# Patient Record
(demographics unavailable — no encounter records)

---

## 2024-11-11 NOTE — ASSESSMENT
[FreeTextEntry1] : Patient is a 87 yo M who presents for BPH/LUTS.  Cont flomax and finasteride for combination therapy. Flomax from PCP, finasteride renewed. LUTS are stable, d/w pt that his DM will affect LUTS as well and better glycemic control will improve LUTS as well No more PSAs given age. F/u 1 yr  Pt will require longitudinal care for pt's chronic/complex urologic conditions.

## 2024-11-11 NOTE — HISTORY OF PRESENT ILLNESS
[FreeTextEntry1] : Patient is an 89 yo M with primary medical history of T2DM, CKD, TIA 9/2023, ureteral stone who presents for BPH/LUTS.  Prior hx: He has been taking flomax for years. He reports nocturia x1. No dysuria, hematuria, incontinence. He did see another urologist and he is here for a second opinion. He had cysto, TRUS and urocuff showing BPH/ROMAN. Prostate volume reportedly 65cc. He did have a UTI that was treated with abx and resolved. He was recommended to have Rezum by the other urologist. He and his daughter are here for 2nd opinion on this procedure. Prior PVR ~100cc.  7/22/24 He has been lost for follow up since 2022. He has not undergone any prostate procedure since then. He has been taking tamsulosin 0.4 mg daily. He reports LUTS stable. Recently, he notes intermittent difficulty with urination at night, nocturia x2, incomplete emptying sensation.   He had syncope episode in March, presented to ER, diagnosed with dehydration, treated with IV fluids.  No gross hematuria, dysuria.  PSA 3.21 in 4/2024 11/11/24 He was started on finasteride last visit in addition/combo w flomax.  LUTS are stable.  Sono today shows unremarkable kidneys and prostate volume 42cc. 4/27/24 - Cr 1.31, PSA 3.21 7/30/24 - A1C 7.0

## 2024-12-13 NOTE — REASON FOR VISIT
[Home] : at home, [unfilled] , at the time of the visit. [Medical Office: ([facility])___] : at the medical office located in

## 2024-12-13 NOTE — REASON FOR VISIT
[Home] : at home, [unfilled] , at the time of the visit. [Medical Office: ([facility])___] : at the medical office located in

## 2024-12-15 NOTE — ASSESSMENT
[FreeTextEntry1] : Repeat a noncontrast chest CT scan for f/u in 1 year. Dr. Bright discussed with patient regarding aforementioned chest CT scan findings today.  Start DuoNeb via nebulizer 3 times a day for bronchitis.  Return for pulmonary follow-up in 6 months.     Time spent in telehealth consultation, charting and care coordination is 35 minutes.

## 2024-12-15 NOTE — PROCEDURE
[FreeTextEntry1] :  CT Chest No Cont             Final  No Documents Attached    	 EXAM: [id number] - CT CHEST  - ORDERED BY: KRISTEN NICHOLE   PROCEDURE DATE:  10/28/2024    INTERPRETATION:  CLINICAL INFORMATION: Lung nodule.  COMPARISON: Chest CT from 7/25/2024.  CONTRAST/COMPLICATIONS: IV Contrast: None Oral Contrast: NONE Complications: None reported at time of study completion  PROCEDURE: CT of the Chest was performed. Sagittal and coronal reformats were performed.  FINDINGS:  LUNGS AND LARGE AIRWAYS: A few small benign-appearing diffusely calcified nodules are again present in the right middle lobe. Bronchial wall thickening bilaterally has worsened. Mild biapical pleural-parenchymal scarring. No change in a 5 mm nodular opacity in the anterior segment of the left upper lobe (4/449). This may represent a branch point of pulmonary vessels rather than a lung nodule. No change in a few solid and groundglass opacity micronodules in the left upper lobe. No change in a cluster of tree-in-bud micronodules in the anterobasal segment of the right lower lobe (4/531). PLEURA: No pleural effusion. VESSELS: Severe coronary artery calcification. Mildly calcified aortic valve. Large amount of calcified plaque aorta. Ascending aorta mildly aneurysmal, measuring 4.2 cm. HEART: Heart size is normal. No pericardial effusion. High riding superior pericardial recess again noted. MEDIASTINUM AND RENE: No lymphadenopathy. CHEST WALL AND LOWER NECK: Within normal limits. VISUALIZED UPPER ABDOMEN: Cholecystectomy. Embolization coils right midabdomen. BONES: Degenerative changes. 0.7 cm sclerotic lesion again present T12 vertebral body.  IMPRESSION: 1. Since 7/25/2024, there has been worsening of bronchial wall thickening bilaterally. This may represent bronchitis. Clinical correlation recommended.  2. No change 5 mm nodular opacity left upper lobe, which may represent the branch point of two vessels rather than a true lung nodule.  3. No change in a a few tiny lung nodules bilaterally, including tree-in-bud nodules in the right lower lobe that are consistent with small airways disease.    --- End of Report ---       IRINA PAREKH MD; Attending Radiologist This document has been electronically signed. Oct 31 2024  1:43PM  Ordered by: KRISTEN NICHOLE       Collected/Examined: [id number] 04:07PM       Verified by: KRISTEN NICHOLE [id number] 10:58PM       Result Communication: No patient communication needed at this time; Stage: Final       Resulted: [id number] 01:19PM       Last Updated: 02Nov2024 10:58PM       Accession: [id number]

## 2024-12-15 NOTE — PROCEDURE
[FreeTextEntry1] :  CT Chest No Cont             Final  No Documents Attached    	 EXAM: [id number] - CT CHEST  - ORDERED BY: KRISTEN NICHOLE   PROCEDURE DATE:  10/28/2024    INTERPRETATION:  CLINICAL INFORMATION: Lung nodule.  COMPARISON: Chest CT from 7/25/2024.  CONTRAST/COMPLICATIONS: IV Contrast: None Oral Contrast: NONE Complications: None reported at time of study completion  PROCEDURE: CT of the Chest was performed. Sagittal and coronal reformats were performed.  FINDINGS:  LUNGS AND LARGE AIRWAYS: A few small benign-appearing diffusely calcified nodules are again present in the right middle lobe. Bronchial wall thickening bilaterally has worsened. Mild biapical pleural-parenchymal scarring. No change in a 5 mm nodular opacity in the anterior segment of the left upper lobe (4/449). This may represent a branch point of pulmonary vessels rather than a lung nodule. No change in a few solid and groundglass opacity micronodules in the left upper lobe. No change in a cluster of tree-in-bud micronodules in the anterobasal segment of the right lower lobe (4/531). PLEURA: No pleural effusion. VESSELS: Severe coronary artery calcification. Mildly calcified aortic valve. Large amount of calcified plaque aorta. Ascending aorta mildly aneurysmal, measuring 4.2 cm. HEART: Heart size is normal. No pericardial effusion. High riding superior pericardial recess again noted. MEDIASTINUM AND RENE: No lymphadenopathy. CHEST WALL AND LOWER NECK: Within normal limits. VISUALIZED UPPER ABDOMEN: Cholecystectomy. Embolization coils right midabdomen. BONES: Degenerative changes. 0.7 cm sclerotic lesion again present T12 vertebral body.  IMPRESSION: 1. Since 7/25/2024, there has been worsening of bronchial wall thickening bilaterally. This may represent bronchitis. Clinical correlation recommended.  2. No change 5 mm nodular opacity left upper lobe, which may represent the branch point of two vessels rather than a true lung nodule.  3. No change in a a few tiny lung nodules bilaterally, including tree-in-bud nodules in the right lower lobe that are consistent with small airways disease.    --- End of Report ---       IRINA PAREKH MD; Attending Radiologist This document has been electronically signed. Oct 31 2024  1:43PM  Ordered by: KRISTEN NICHOLE       Collected/Examined: [id number] 04:07PM       Verified by: KRISTEN NICHOLE [id number] 10:58PM       Result Communication: No patient communication needed at this time; Stage: Final       Resulted: [id number] 01:19PM       Last Updated: 02Nov2024 10:58PM       Accession: [id number]

## 2024-12-15 NOTE — HISTORY OF PRESENT ILLNESS
[TextBox_4] : Still has mild cough along with mild shortness of breath.  No chest pain, fever or chills.

## 2025-04-24 NOTE — REASON FOR VISIT
[Follow-Up Visit] : a follow-up visit for [FreeTextEntry2] : spinal stenosis of lumbar region with neurogenic claudication

## 2025-04-24 NOTE — HISTORY OF PRESENT ILLNESS
[de-identified] : - Summary : A patient presented complaining of pain in both legs and the lower back, as well as symptoms of numbness in the leg and tingling sensation in the toes. The patient reported having these symptoms for the past three months. Patient history reveals a lumbar compression fracture approximately two years ago from which the patient seems to have recovered from. The patient also reports having high blood pressure currently despite previously being stable on medication.  - Chief Complaint (CC) : The patient mainly complains of pain in both legs and lower back. - History of Present Illness (HPI) : This present health concern started three months prior. The patient has been experiencing pain in both legs and lower back, worse in the mornings upon walking. Symptoms include numbness in the leg and tingling sensation in the toes. A radiographic imaging taken last year showed a pinched nerve and severe stenosis which could be causing the current symptoms. - Past Medical History : Patient had a lumbar compression fracture about two years ago. The patient also has a history of hypertension, currently managed with medication. - Past Surgical History : No known surgical history was provided. - Family History : Family medical history was not discussed during the session. - Social History : The exact social situation or habits of the patient weren't discussed during the visit. - Review of Systems : The patient reveals experiencing symptoms consistant with severe lumbar stenosis including leg pain and lower back pain. The musculoskeletal system's functioning seems to be affected as a result. - Medications : The patient uses antihypertensive medication though details are not provided. - Allergies : No known allergies were mentioned during the visit.

## 2025-04-24 NOTE — PHYSICAL EXAM
[Wide-Based] : wide-based [Stooped] : stooped [Cane] : ambulates with cane [LE] : Sensory: Intact in bilateral lower extremities [0] : left ankle jerk 0 [DP] : dorsalis pedis 2+ and symmetric bilaterally [Finley's Sign] : negative Finley's sign [Plantar Reflex Right Only] : absent on the right [Plantar Reflex Left Only] : absent on the left [DTR Reflexes Clonus Of Right Ankle (___ Beats)] : absent on the right [DTR Reflexes Clonus Of Left Ankle (___ Beats)] : absent on the left [de-identified] : seen with his daughter and wife The pt is awake, alert and oriented to self, place and time, is comfortable and in no acute distress. Inspection of neck, back and lower extremities bilaterally reveals no rashes or ecchymotic lesions.  There is no obvious abnormal spinal curvature in the sagittal and coronal planes. There is no tenderness over the cervical, thoracic or lumbar spine, or the paraspinal or upper and lower extremities musculature. There is no sacroiliac tenderness. No greater trochanteric tenderness bilaterally. No atrophy or abnormal movements noted in the upper or lower extremities. There is no swelling noted in the upper or lower extremities bilaterally. No cervical lymphadenopathy noted anteriorly. No joint laxity noted in the upper and lower extremity joints bilaterally. Hip range of motion is degrees internal rotation 30 external rotation without pain. Full range of motion of the shoulders bilaterally with no significant pain There is no groin pain with hip internal rotation and a negative NICHOLAS test bilaterally.  [de-identified] : seen with a lumbar corset [de-identified] : 4 views lumbar spine obtained today demonstrate no significant scoliosis.  Vascular clips right upper quadrant.  Degenerative changes noted throughout the lumbar spine with kyphosis from T11-L5.  No obvious acute fractures suspected.  Disc degeneration is most prominent at the L2-3 and L3-4 levels.  No dynamic instability between flexion-extension with persistence of segmental kyphosis and extension  ACC: [id number] EXAM: MR SPINE LUMBAR ORDERED BY: DOLORES GARCIA  PROCEDURE DATE: 08/07/2024  INTERPRETATION: CLINICAL INFORMATION: Lower back pain and progressive weakness.  ADDITIONAL CLINICAL INFORMATION: Not Applicable  TECHNIQUE: Multiplanar, multisequence MRI was performed of the lumbar spine.  PRIOR STUDIES: Lumbar spine MRI dated 3/3/2023  FINDINGS:  LOCALIZER: No additional findings. BONES: Modic type II changes at the L2-3, L3-4, L4-5, and L5 when disc space. Mild superimpose Modic type I changes at the L2-3 disc space. Varying levels of disc space narrowing. There is vacuum phenomenon at L2-3. No acute fracture is seen. A rounded focus of low signal is seen in the right iliac bone with intervening vasculature and suspected small locules of fat. This lesion measures 1.7 x 1.7 cm and is unchanged from prior exam. ALIGNMENT: There is kyphosis centered at the L2-3 level. Mild retrolisthesis of L2 on L3 and L3 on L4, L4 on L5. SACROILIAC JOINTS/SACRUM: There is no sacral fracture. The SI joints are partially visualized but are intact. CONUS AND CAUDA EQUINA: The distal cord and conus are normal in signal. Conus terminates at L1. VISUALIZED INTRAPELVIC/INTRA-ABDOMINAL SOFT TISSUES: Bilateral small renal cysts. PARASPINAL SOFT TISSUES: Mild atrophy in the paraspinal musculature.   INDIVIDUAL LEVELS:  LOWER THORACIC SPINE: No spinal canal or neuroforaminal stenosis.  L1-L2: Mild facet arthrosis. Moderate posterior osseous ridging with small central broad-based disc protrusion effacing the ventral thecal sac. Mild bilateral lateral recess narrowing. No central canal or foraminal narrowing. Progressed compared to prior exam. L2-L3: Moderate posterior osseous ridging. Moderate to large osseous ridging and disc bulging. Severe central canal stenosis. Moderate to severe right and mild left foraminal narrowing. Similar to prior exam L3-L4: Mild retrolisthesis and posterior osseous ridging with disc bulging. Moderate facet and ligamentous hypertrophy. Mild to moderate bilateral foraminal narrowing. Moderate bilateral lateral recess narrowing. Mild to moderate central canal narrowing. Mildly progressed from prior study. L4-L5: Mild retrolisthesis and posterior osseous ridging. Moderate facet and severe ligamentous hypertrophy. Moderate posterior osseous spurring into the foramen. Moderate bilateral foraminal narrowing is worse on the left. Mild to moderate bilateral lateral recess narrowing. No central canal narrowing. Unchanged. L5-S1: Mild facet arthrosis. Posterior osseous ridging and disc bulging into the foramen. Mild to moderate bilateral foraminal narrowing. No central canal narrowing. Mild right lateral recess narrowing. Unchanged.   IMPRESSION:  Moderate multilevel spondylosis is mildly progressed from the prior exam.  --- End of Report ---      CHERELLE SYED MD; Attending Radiologist This document has been electronically signed. Aug 7 2024 4:45PM

## 2025-04-24 NOTE — DISCUSSION/SUMMARY
[Medication Risks Reviewed] : Medication risks reviewed [de-identified] : Assessment: - Summary : Based on the patient's symptoms, physical examination and previous MRI results, it can be assessed that the patient is suffering from leg and lower back pain due to severe lumbar stenosis. - Problems : - Severe lumbar stenosis  - Hypertension  - Differential Diagnosis : - Sciatica due to herniated disc  - Osteoarthritis of the hip  - Peripheral neuropathy  - Lumbar radiculopathy  Plan: - Summary : The treatment plan includes setting up home physical therapy to help strengthen the patient's muscles and improve walking ability. The patient was also prescribed gabapentin to be taken at night. For pain management, the patient will be scheduled for an epidural steroid injection. The patient was also advised to visit their medical doctor to help manage the high blood pressure. - Plan : - Home physical therapy, referral was provided  - Gabapentin at nighttime, 100 mg tablets prescribed  - Epidural steroid injection, we will schedule bilateral L3 transforaminal epidural steroid injection at her earliest convenience  - Consultation with primary physician for hypertension management  Given his advanced age the patient is not interested in surgical interventions and we will continue to follow him nonoperatively for his condition.

## 2025-04-24 NOTE — HISTORY OF PRESENT ILLNESS
[de-identified] : - Summary : A patient presented complaining of pain in both legs and the lower back, as well as symptoms of numbness in the leg and tingling sensation in the toes. The patient reported having these symptoms for the past three months. Patient history reveals a lumbar compression fracture approximately two years ago from which the patient seems to have recovered from. The patient also reports having high blood pressure currently despite previously being stable on medication.  - Chief Complaint (CC) : The patient mainly complains of pain in both legs and lower back. - History of Present Illness (HPI) : This present health concern started three months prior. The patient has been experiencing pain in both legs and lower back, worse in the mornings upon walking. Symptoms include numbness in the leg and tingling sensation in the toes. A radiographic imaging taken last year showed a pinched nerve and severe stenosis which could be causing the current symptoms. - Past Medical History : Patient had a lumbar compression fracture about two years ago. The patient also has a history of hypertension, currently managed with medication. - Past Surgical History : No known surgical history was provided. - Family History : Family medical history was not discussed during the session. - Social History : The exact social situation or habits of the patient weren't discussed during the visit. - Review of Systems : The patient reveals experiencing symptoms consistant with severe lumbar stenosis including leg pain and lower back pain. The musculoskeletal system's functioning seems to be affected as a result. - Medications : The patient uses antihypertensive medication though details are not provided. - Allergies : No known allergies were mentioned during the visit.

## 2025-04-24 NOTE — DISCUSSION/SUMMARY
[Medication Risks Reviewed] : Medication risks reviewed [de-identified] : Assessment: - Summary : Based on the patient's symptoms, physical examination and previous MRI results, it can be assessed that the patient is suffering from leg and lower back pain due to severe lumbar stenosis. - Problems : - Severe lumbar stenosis  - Hypertension  - Differential Diagnosis : - Sciatica due to herniated disc  - Osteoarthritis of the hip  - Peripheral neuropathy  - Lumbar radiculopathy  Plan: - Summary : The treatment plan includes setting up home physical therapy to help strengthen the patient's muscles and improve walking ability. The patient was also prescribed gabapentin to be taken at night. For pain management, the patient will be scheduled for an epidural steroid injection. The patient was also advised to visit their medical doctor to help manage the high blood pressure. - Plan : - Home physical therapy, referral was provided  - Gabapentin at nighttime, 100 mg tablets prescribed  - Epidural steroid injection, we will schedule bilateral L3 transforaminal epidural steroid injection at her earliest convenience  - Consultation with primary physician for hypertension management  Given his advanced age the patient is not interested in surgical interventions and we will continue to follow him nonoperatively for his condition.

## 2025-04-24 NOTE — PHYSICAL EXAM
[Wide-Based] : wide-based [Stooped] : stooped [Cane] : ambulates with cane [LE] : Sensory: Intact in bilateral lower extremities [0] : left ankle jerk 0 [DP] : dorsalis pedis 2+ and symmetric bilaterally [Finley's Sign] : negative Finley's sign [Plantar Reflex Right Only] : absent on the right [Plantar Reflex Left Only] : absent on the left [DTR Reflexes Clonus Of Right Ankle (___ Beats)] : absent on the right [DTR Reflexes Clonus Of Left Ankle (___ Beats)] : absent on the left [de-identified] : seen with his daughter and wife The pt is awake, alert and oriented to self, place and time, is comfortable and in no acute distress. Inspection of neck, back and lower extremities bilaterally reveals no rashes or ecchymotic lesions.  There is no obvious abnormal spinal curvature in the sagittal and coronal planes. There is no tenderness over the cervical, thoracic or lumbar spine, or the paraspinal or upper and lower extremities musculature. There is no sacroiliac tenderness. No greater trochanteric tenderness bilaterally. No atrophy or abnormal movements noted in the upper or lower extremities. There is no swelling noted in the upper or lower extremities bilaterally. No cervical lymphadenopathy noted anteriorly. No joint laxity noted in the upper and lower extremity joints bilaterally. Hip range of motion is degrees internal rotation 30 external rotation without pain. Full range of motion of the shoulders bilaterally with no significant pain There is no groin pain with hip internal rotation and a negative NICHOLAS test bilaterally.  [de-identified] : seen with a lumbar corset [de-identified] : 4 views lumbar spine obtained today demonstrate no significant scoliosis.  Vascular clips right upper quadrant.  Degenerative changes noted throughout the lumbar spine with kyphosis from T11-L5.  No obvious acute fractures suspected.  Disc degeneration is most prominent at the L2-3 and L3-4 levels.  No dynamic instability between flexion-extension with persistence of segmental kyphosis and extension  ACC: [id number] EXAM: MR SPINE LUMBAR ORDERED BY: DOLORES GARCIA  PROCEDURE DATE: 08/07/2024  INTERPRETATION: CLINICAL INFORMATION: Lower back pain and progressive weakness.  ADDITIONAL CLINICAL INFORMATION: Not Applicable  TECHNIQUE: Multiplanar, multisequence MRI was performed of the lumbar spine.  PRIOR STUDIES: Lumbar spine MRI dated 3/3/2023  FINDINGS:  LOCALIZER: No additional findings. BONES: Modic type II changes at the L2-3, L3-4, L4-5, and L5 when disc space. Mild superimpose Modic type I changes at the L2-3 disc space. Varying levels of disc space narrowing. There is vacuum phenomenon at L2-3. No acute fracture is seen. A rounded focus of low signal is seen in the right iliac bone with intervening vasculature and suspected small locules of fat. This lesion measures 1.7 x 1.7 cm and is unchanged from prior exam. ALIGNMENT: There is kyphosis centered at the L2-3 level. Mild retrolisthesis of L2 on L3 and L3 on L4, L4 on L5. SACROILIAC JOINTS/SACRUM: There is no sacral fracture. The SI joints are partially visualized but are intact. CONUS AND CAUDA EQUINA: The distal cord and conus are normal in signal. Conus terminates at L1. VISUALIZED INTRAPELVIC/INTRA-ABDOMINAL SOFT TISSUES: Bilateral small renal cysts. PARASPINAL SOFT TISSUES: Mild atrophy in the paraspinal musculature.   INDIVIDUAL LEVELS:  LOWER THORACIC SPINE: No spinal canal or neuroforaminal stenosis.  L1-L2: Mild facet arthrosis. Moderate posterior osseous ridging with small central broad-based disc protrusion effacing the ventral thecal sac. Mild bilateral lateral recess narrowing. No central canal or foraminal narrowing. Progressed compared to prior exam. L2-L3: Moderate posterior osseous ridging. Moderate to large osseous ridging and disc bulging. Severe central canal stenosis. Moderate to severe right and mild left foraminal narrowing. Similar to prior exam L3-L4: Mild retrolisthesis and posterior osseous ridging with disc bulging. Moderate facet and ligamentous hypertrophy. Mild to moderate bilateral foraminal narrowing. Moderate bilateral lateral recess narrowing. Mild to moderate central canal narrowing. Mildly progressed from prior study. L4-L5: Mild retrolisthesis and posterior osseous ridging. Moderate facet and severe ligamentous hypertrophy. Moderate posterior osseous spurring into the foramen. Moderate bilateral foraminal narrowing is worse on the left. Mild to moderate bilateral lateral recess narrowing. No central canal narrowing. Unchanged. L5-S1: Mild facet arthrosis. Posterior osseous ridging and disc bulging into the foramen. Mild to moderate bilateral foraminal narrowing. No central canal narrowing. Mild right lateral recess narrowing. Unchanged.   IMPRESSION:  Moderate multilevel spondylosis is mildly progressed from the prior exam.  --- End of Report ---      CHERELLE SYED MD; Attending Radiologist This document has been electronically signed. Aug 7 2024 4:45PM

## 2025-05-06 NOTE — HISTORY OF PRESENT ILLNESS
[FreeTextEntry2] : Most pleasant 89-year-old Chinese male, who speaks a little English accompanied by his daughter serving as , who comes in for Hospital follow-up for: DOCA lithiasis complicated by probable multiple liver abscesses and bacteremia status post ERCPDischarged on 6 weeks oral antibiotics with ID follow-up.  For bradycardia and MARVIN losartan coreg switched to amlodipine, stooling without edema, BP looks good today. Jose De Jesus'g PO, denies Patten, abdl pain, fvr nausea. Echo 61% mo MR

## 2025-05-06 NOTE — ASSESSMENT
[FreeTextEntry1] : *E coli bacteremia, prob liver abscesses d/t choledocholith s/p ERCP. Tolg abx, needs 6 wks. Ref ID *T2DM. Glc high, see with sugars in a month, may need more januvia. *HTN *Mild LVH BP controlled, cautiously re introduce ARB. Watch neg chronotropes given [place] inpt while septic. *GERD. bid ppi. Puree diet **History severe GI bleed, probably diverticular bleed. *CKD stage 2. Normal PTH, vit D 28 *Chronic mild anemia, likely anemia of chronic disease. Nl TSH iron studies.  baby Asa qd. *HLD. Atorvastatin 40 mg. Excellent lipid profile normal LFTs. *Routine adult health maintenance. Vaccinations: Asked to obtain Tdap Prevnar 20 Shingrix Covid status on rtn. Colon cancer screening: Prior colonoscopies at time of GI bleed iron studies normal. Has aged out of screening. Prostate cancer screening: H/O BPH on flomax. Reassuring PSA.  Aged out HCV HIV.WNL TSH.  It was a pleasure to visit with Mr Haines and his daughter today. Answered all questions as best I could. Disp: 1 month

## 2025-05-06 NOTE — PHYSICAL EXAM
[No Acute Distress] : no acute distress [Well Nourished] : well nourished [Well Developed] : well developed [Well-Appearing] : well-appearing [Normal Sclera/Conjunctiva] : normal sclera/conjunctiva [PERRL] : pupils equal round and reactive to light [EOMI] : extraocular movements intact [Normal Outer Ear/Nose] : the outer ears and nose were normal in appearance [Normal Oropharynx] : the oropharynx was normal [No JVD] : no jugular venous distention [No Lymphadenopathy] : no lymphadenopathy [Supple] : supple [Thyroid Normal, No Nodules] : the thyroid was normal and there were no nodules present [No Respiratory Distress] : no respiratory distress  [No Accessory Muscle Use] : no accessory muscle use [Clear to Auscultation] : lungs were clear to auscultation bilaterally [Normal Rate] : normal rate  [Regular Rhythm] : with a regular rhythm [Normal S1, S2] : normal S1 and S2 [No Murmur] : no murmur heard [No Carotid Bruits] : no carotid bruits [No Abdominal Bruit] : a ~M bruit was not heard ~T in the abdomen [No Varicosities] : no varicosities [Pedal Pulses Present] : the pedal pulses are present [No Edema] : there was no peripheral edema [No Palpable Aorta] : no palpable aorta [No Extremity Clubbing/Cyanosis] : no extremity clubbing/cyanosis [Soft] : abdomen soft [Non Tender] : non-tender [Non-distended] : non-distended [No Masses] : no abdominal mass palpated [No HSM] : no HSM [Normal Bowel Sounds] : normal bowel sounds [Normal Posterior Cervical Nodes] : no posterior cervical lymphadenopathy [Normal Anterior Cervical Nodes] : no anterior cervical lymphadenopathy [No CVA Tenderness] : no CVA  tenderness [No Spinal Tenderness] : no spinal tenderness [No Joint Swelling] : no joint swelling [Grossly Normal Strength/Tone] : grossly normal strength/tone [No Rash] : no rash [Coordination Grossly Intact] : coordination grossly intact [No Focal Deficits] : no focal deficits [Normal Gait] : normal gait [Deep Tendon Reflexes (DTR)] : deep tendon reflexes were 2+ and symmetric [Normal Affect] : the affect was normal [Normal Insight/Judgement] : insight and judgment were intact [de-identified] : shekhar IVY

## 2025-06-17 NOTE — HEALTH RISK ASSESSMENT
[No] : No [No falls in past year] : Patient reported no falls in the past year [0] : 2) Feeling down, depressed, or hopeless: Not at all (0) [PHQ-2 Negative - No further assessment needed] : PHQ-2 Negative - No further assessment needed [de-identified] : doing PT [de-identified] : OK [[facility]] : 10 [[id number]] : 0 [Never] : Never

## 2025-06-17 NOTE — HISTORY OF PRESENT ILLNESS
[FreeTextEntry1] : 6-week follow-up [de-identified] : Most pleasant 89-year-old Chinese male, who speaks a little English accompanied by his son serving as , who comes in for 6-week follow-up after being hospitalized for choledocholithiasis complicated by E. coli hepatic abscesses,Status post inpatient ERCP followed by 6 weeks of  Ceftin and Flagyl which he just finished.   Underwent U/S 6/3/2025 Which appeared normal, though we note that ultrasound and CT on admission were also normal though MRI was not.  Saw Dr Shelley/ID cbc cmp  U/S all improved. Pt has not heard from ID re these results, which we shared with pt, son today.  Presently the patient is afebrile tolerating p.o. with some minimal residual right upper quadrant pain.  He denies GERD symptoms symptoms well-controlled on twice daily PPI. No esophagitis on recent ERCP.  He is stooling adequately.  He kindly brings in blood sugars and blood pressure checks as requested.  Sugars are running higher than ideal 146-174 on 50 mg sitagliptin, previously on glyburide and metformin held for acute kidney injury.  Blood pressures are ranging 109-126/55-69 on amlodipine monotherapy in this diabetic with diabetic nephropathy, Previously on Cozaar 50 mg and carvedilol 3.125 mg twice daily.The Coreg was held for bradycardia while septic.The Cozaar was held for acute kidney injury related to infection.Stooling adequately, but notes slightly worsening edema right greater than left lower extremity ruled out for DVT during hospitalization.  Since he has not seen ID postdischarge, he has not had any interval laboratory testing as best I can make out.  For 5 minute unresponsiveness in [facility] 9/2023, 11/23 work up MRI/MRA/11 day Zio: PICA PCA not well seen Right P1 branch of PCA highly stenotic, Left P1/P2 also stenotic, also Left temporal petechial bleed and left posterior centrum semiovale   lacune (old) EF 55% mi/mo AR TR MR. 4 beat NSVT , pulse  average 58 on Zio 1/2024.  Past medical history of longstanding type 2 diabetes on glyburide/metformin, hypertension, BPH, lumbar spinal stenosis, life-threatening GI bleed x 2 most recently 2016 secondary to "mesenteric diverticulum" (epiploic appendagitis?)  (per daughter).  Patient and his wife were staying with his daughter and her ER physician  in the [place] area, but are back living in [place].

## 2025-06-17 NOTE — HEALTH RISK ASSESSMENT
[No] : No [No falls in past year] : Patient reported no falls in the past year [0] : 2) Feeling down, depressed, or hopeless: Not at all (0) [PHQ-2 Negative - No further assessment needed] : PHQ-2 Negative - No further assessment needed [de-identified] : doing PT [de-identified] : OK [[facility]] : 10 [[id number]] : 0 [Never] : Never

## 2025-06-17 NOTE — PHYSICAL EXAM
[No JVD] : no jugular venous distention [Soft] : abdomen soft [Normal Bowel Sounds] : normal bowel sounds [Kyphosis] : kyphosis [Normal] : affect was normal and insight and judgment were intact [de-identified] : Right greater than left 1-2+ pitting edema below the knees.  No skin breakdown. [de-identified] : Minimal right upper quadrant tenderness.  No guarding.Large scars postop old

## 2025-06-17 NOTE — PHYSICAL EXAM
[No JVD] : no jugular venous distention [Soft] : abdomen soft [Normal Bowel Sounds] : normal bowel sounds [Kyphosis] : kyphosis [Normal] : affect was normal and insight and judgment were intact [de-identified] : Right greater than left 1-2+ pitting edema below the knees.  No skin breakdown. [de-identified] : Minimal right upper quadrant tenderness.  No guarding.Large scars postop old

## 2025-06-17 NOTE — ASSESSMENT
[FreeTextEntry1] : *E coli bacteremia, prob liver abscesses d/t choledocholith s/p ERCP 5/2025. Finished ceftin flagyl. Reassured by pt hx, exam more than labs and U/S as LFT elevn more likely driven more by choledochlithiasis and ERCP. Dismissed from ID, I spoke w Dr Shelley directly.  *T2DM. Glc too high, plan increase Januvia to 100 mg daily, we will update fructosamine A1c and random glucose today.Asked to let me know where sugars are running in about 10 days on the increased dose of Januvia. *HTN.Previously on ARB and beta-blocker, stopped for MARVIN and bradycardia. *Mild LVH BP controlled, Updating electrolytes.  Expect to reintroduce his losartan transitioning away from amlodipine given constipation and leg edema complications.  It will be hard to establish chronicity of the swelling.  We also discussed use of compression hose.cautiously re introduce ARB. Watch neg chronotropes given [place] inpt while septic. Update BMP. *GERD. bid ppiCan now be de-escalated to once daily and his diet can be advanced. *History severe GI bleed, probably diverticular bleed. *CKD stage 2. Normal PTH, vit D 28 *Chronic mild anemia, likely anemia of chronic disease. Nl TSH iron studies.  baby Asa qd. *HLD. Atorvastatin 40 mg. Excellent lipid profile normal LFTs. *Routine adult health maintenance. Vaccinations: Asked to obtain Tdap Prevnar 20 Shingrix Covid status on rtn. Colon cancer screening: Prior colonoscopies at time of GI bleed iron studies normal. Has aged out of screening. Prostate cancer screening: H/O BPH on flomax. Reassuring PSA. Aged out HCV HIV.WNL TSH.  It was a pleasure to visit with Mr Haines and his son today. Answered all questions as best I could. Disp:3 months

## 2025-06-17 NOTE — HISTORY OF PRESENT ILLNESS
[FreeTextEntry1] : 6-week follow-up [de-identified] : Most pleasant 89-year-old Chinese male, who speaks a little English accompanied by his son serving as , who comes in for 6-week follow-up after being hospitalized for choledocholithiasis complicated by E. coli hepatic abscesses,Status post inpatient ERCP followed by 6 weeks of  Ceftin and Flagyl which he just finished.   Underwent U/S 6/3/2025 Which appeared normal, though we note that ultrasound and CT on admission were also normal though MRI was not.  Saw Dr Shelley/ID cbc cmp  U/S all improved. Pt has not heard from ID re these results, which we shared with pt, son today.  Presently the patient is afebrile tolerating p.o. with some minimal residual right upper quadrant pain.  He denies GERD symptoms symptoms well-controlled on twice daily PPI. No esophagitis on recent ERCP.  He is stooling adequately.  He kindly brings in blood sugars and blood pressure checks as requested.  Sugars are running higher than ideal 146-174 on 50 mg sitagliptin, previously on glyburide and metformin held for acute kidney injury.  Blood pressures are ranging 109-126/55-69 on amlodipine monotherapy in this diabetic with diabetic nephropathy, Previously on Cozaar 50 mg and carvedilol 3.125 mg twice daily.The Coreg was held for bradycardia while septic.The Cozaar was held for acute kidney injury related to infection.Stooling adequately, but notes slightly worsening edema right greater than left lower extremity ruled out for DVT during hospitalization.  Since he has not seen ID postdischarge, he has not had any interval laboratory testing as best I can make out.  For 5 minute unresponsiveness in [facility] 9/2023, 11/23 work up MRI/MRA/11 day Zio: PICA PCA not well seen Right P1 branch of PCA highly stenotic, Left P1/P2 also stenotic, also Left temporal petechial bleed and left posterior centrum semiovale   lacune (old) EF 55% mi/mo AR TR MR. 4 beat NSVT , pulse  average 58 on Zio 1/2024.  Past medical history of longstanding type 2 diabetes on glyburide/metformin, hypertension, BPH, lumbar spinal stenosis, life-threatening GI bleed x 2 most recently 2016 secondary to "mesenteric diverticulum" (epiploic appendagitis?)  (per daughter).  Patient and his wife were staying with his daughter and her ER physician  in the [place] area, but are back living in [place].